# Patient Record
Sex: FEMALE | Race: WHITE | NOT HISPANIC OR LATINO | Employment: OTHER | URBAN - METROPOLITAN AREA
[De-identification: names, ages, dates, MRNs, and addresses within clinical notes are randomized per-mention and may not be internally consistent; named-entity substitution may affect disease eponyms.]

---

## 2018-02-27 ENCOUNTER — OFFICE VISIT (OUTPATIENT)
Dept: GASTROENTEROLOGY | Facility: CLINIC | Age: 73
End: 2018-02-27

## 2018-02-27 VITALS
HEART RATE: 67 BPM | HEIGHT: 67 IN | TEMPERATURE: 98 F | RESPIRATION RATE: 16 BRPM | BODY MASS INDEX: 24.42 KG/M2 | OXYGEN SATURATION: 99 % | WEIGHT: 155.6 LBS | SYSTOLIC BLOOD PRESSURE: 126 MMHG | DIASTOLIC BLOOD PRESSURE: 78 MMHG

## 2018-02-27 DIAGNOSIS — K59.1 FUNCTIONAL DIARRHEA: Primary | ICD-10-CM

## 2018-02-27 PROCEDURE — 99203 OFFICE O/P NEW LOW 30 MIN: CPT | Performed by: INTERNAL MEDICINE

## 2018-02-27 RX ORDER — MONTELUKAST SODIUM 10 MG/1
10 TABLET ORAL NIGHTLY
COMMUNITY
Start: 2018-02-06

## 2018-02-27 RX ORDER — METOPROLOL SUCCINATE 25 MG/1
25 TABLET, EXTENDED RELEASE ORAL DAILY
COMMUNITY
Start: 2018-01-28

## 2018-02-27 RX ORDER — ALOSETRON HYDROCHLORIDE 0.5 MG/1
0.5 TABLET, FILM COATED ORAL DAILY
Qty: 90 TABLET | Refills: 3 | Status: SHIPPED | OUTPATIENT
Start: 2018-02-27 | End: 2018-04-17 | Stop reason: SDUPTHER

## 2018-02-27 RX ORDER — SERTRALINE HYDROCHLORIDE 100 MG/1
100 TABLET, FILM COATED ORAL DAILY
COMMUNITY
Start: 2018-02-06

## 2018-02-27 RX ORDER — ROSUVASTATIN CALCIUM 10 MG/1
10 TABLET, COATED ORAL NIGHTLY
COMMUNITY
Start: 2018-01-10

## 2018-02-27 RX ORDER — FEXOFENADINE HCL 180 MG/1
180 TABLET ORAL DAILY
COMMUNITY

## 2018-02-27 RX ORDER — PANTOPRAZOLE SODIUM 20 MG/1
20 TABLET, DELAYED RELEASE ORAL DAILY
COMMUNITY
Start: 2017-12-18

## 2018-02-27 RX ORDER — ALOSETRON HYDROCHLORIDE 0.5 MG/1
0.5 TABLET, FILM COATED ORAL 2 TIMES DAILY
COMMUNITY
Start: 2018-01-29 | End: 2018-02-27 | Stop reason: SDUPTHER

## 2018-02-27 NOTE — PROGRESS NOTES
PCP: Jericho Ann MD    Chief Complaint   Patient presents with   • Diverticulitis        History of Present Illness:   Teresa Levy is a 72 y.o. female who presents to the GI clinic for IBS-D and h/o diverticulitis. Previously a patient of Dr. Brunner. Onset of ibs-d > 10 years ago characterized by 6 liquid stools a day and abdominal discomfort. She describes past therapies such as bentyl and elavil but doesn't remember more. Unfortunately, she reports a few weeks ago that she had llq pain, fever and reportedly noncomplicated diverticulitis. Pain without radiation, 10/10, sharp. Pain went away after a few weeks.      Past Medical History:   Diagnosis Date   • Diverticulitis of colon    • Irritable bowel syndrome        Past Surgical History:   Procedure Laterality Date   • COLONOSCOPY     • MASTECTOMY Right 1980   • UPPER GASTROINTESTINAL ENDOSCOPY           Current Outpatient Prescriptions:   •  alosetron (LOTRONEX) 0.5 MG tablet, Take 0.5 mg by mouth 2 (Two) Times a Day., Disp: , Rfl:   •  fexofenadine (ALLEGRA) 180 MG tablet, Take 180 mg by mouth Daily., Disp: , Rfl:   •  metoprolol succinate XL (TOPROL-XL) 25 MG 24 hr tablet, , Disp: , Rfl:   •  montelukast (SINGULAIR) 10 MG tablet, , Disp: , Rfl:   •  pantoprazole (PROTONIX) 20 MG EC tablet, , Disp: , Rfl:   •  rosuvastatin (CRESTOR) 10 MG tablet, , Disp: , Rfl:   •  sertraline (ZOLOFT) 100 MG tablet, , Disp: , Rfl:     No Known Allergies    History reviewed. No pertinent family history.    Social History     Social History   • Marital status: Unknown     Spouse name: N/A   • Number of children: N/A   • Years of education: N/A     Occupational History   • Not on file.     Social History Main Topics   • Smoking status: Not on file   • Smokeless tobacco: Not on file   • Alcohol use Not on file   • Drug use: Not on file   • Sexual activity: Not on file     Other Topics Concern   • Not on file     Social History Narrative   • No narrative on file        Review of Systems   Constitutional: Negative for fever.   HENT: Negative for nosebleeds.    Eyes: Negative for pain.   Respiratory: Negative for choking.    Gastrointestinal: Positive for abdominal distention, abdominal pain (Cramping), diarrhea, nausea and vomiting.   Allergic/Immunologic: Negative for food allergies and immunocompromised state.   Psychiatric/Behavioral: Negative for confusion and suicidal ideas.   All other systems reviewed and are negative.    All other systems reviewed and are negative.    Vitals:    02/27/18 0816   BP: 126/78   Pulse: 67   Resp: 16   Temp: 98 °F (36.7 °C)   SpO2: 99%       Physical Exam  General Appearance:  Vitals as above. no acute distress  Head/face:  Normocephalic, atraumatic  Eyes:   EOMI, no conjunctivitis or icterus   Nose/Sinuses:  Nares patent bilaterally without discharge or lesions  Mouth/Throat:  Posterior pharynx is pink without drainage or exudates;  dentition is in good condition and repair  Neck:  trachea is midline, no thyromegaly  Lungs:  Clear to auscultation bilaterally  Heart:  Regular rate and rhythm without murmur, gallop or rub  Abdomen:  Soft, non-tender to palpation, no obvious masses, bowel sounds positive in four quadrants; no abdominal bruits; no guarding or rebound tenderness  Neurologic:  Alert; no focal deficits; age appropriate behavior and speech  Psychiatric: mood and affect are congruent  Vascular: extremities without edema  Skin: no rash or cyanosis.      Assessment/Plan  1.) IBS-D  2.) h/o reportedly diverticulitis  - Onset of IBS-D: > 10 years ago, past managed by dr. Brunner, therapies of bentyl, elavil...  - will refill alosetron, educated on fda warning of ischemic colitis  - acquire records of last colon 2015 and diverticulitis images, labs fromrecent hospital visit  - rtc in 3 months    Trung Pulido MD  2/27/2018

## 2018-03-19 ENCOUNTER — TELEPHONE (OUTPATIENT)
Dept: GASTROENTEROLOGY | Facility: CLINIC | Age: 73
End: 2018-03-19

## 2018-03-21 NOTE — TELEPHONE ENCOUNTER
Spoke with patient, she would like to see If we can appeal. Patient states she has been on medication for 8 years and its only thing that works for her.

## 2018-03-26 ENCOUNTER — TELEPHONE (OUTPATIENT)
Dept: GASTROENTEROLOGY | Facility: CLINIC | Age: 73
End: 2018-03-26

## 2018-04-16 ENCOUNTER — TELEPHONE (OUTPATIENT)
Dept: GASTROENTEROLOGY | Facility: CLINIC | Age: 73
End: 2018-04-16

## 2018-04-16 NOTE — TELEPHONE ENCOUNTER
----- Message from Teresa Levy sent at 4/16/2018 12:20 PM EDT -----  Regarding: Prescription Question  Contact: [phone]  I take at least 1 alosetron every day but some days I have to take two. Dr. Brunner  always wrote  prescription to say 2 a day so I would have enough when I needed to take 2. Sometimes I have to take 2 for several days.  Would you please consider writing the prescription this way? Thank you

## 2018-04-17 DIAGNOSIS — K59.1 FUNCTIONAL DIARRHEA: ICD-10-CM

## 2018-04-17 RX ORDER — ALOSETRON HYDROCHLORIDE 0.5 MG/1
0.5 TABLET, FILM COATED ORAL 2 TIMES DAILY
Qty: 180 TABLET | Refills: 3 | Status: SHIPPED | OUTPATIENT
Start: 2018-04-17 | End: 2019-05-28 | Stop reason: SDUPTHER

## 2018-05-29 ENCOUNTER — OFFICE VISIT (OUTPATIENT)
Dept: GASTROENTEROLOGY | Facility: CLINIC | Age: 73
End: 2018-05-29

## 2018-05-29 VITALS
DIASTOLIC BLOOD PRESSURE: 82 MMHG | HEART RATE: 64 BPM | SYSTOLIC BLOOD PRESSURE: 138 MMHG | WEIGHT: 157 LBS | HEIGHT: 67 IN | BODY MASS INDEX: 24.64 KG/M2 | RESPIRATION RATE: 16 BRPM | OXYGEN SATURATION: 99 % | TEMPERATURE: 98 F

## 2018-05-29 DIAGNOSIS — K59.1 FUNCTIONAL DIARRHEA: Primary | ICD-10-CM

## 2018-05-29 PROCEDURE — 99212 OFFICE O/P EST SF 10 MIN: CPT | Performed by: INTERNAL MEDICINE

## 2018-05-29 NOTE — PROGRESS NOTES
PCP: Jericho Ann MD    Chief Complaint   Patient presents with   • Diarrhea     Follow-Up       History of Present Illness:   Teresa Levy is a 72 y.o. female who presents to GI clinic as a follow up for ibs-d. Previously a patient of mark brunner's, she had done well on lotronex. Still has intermittent abdominal pains. Diarrhea controlled. Has no complaints.    Past Medical History:   Diagnosis Date   • Diverticulitis of colon    • Irritable bowel syndrome        Past Surgical History:   Procedure Laterality Date   • COLONOSCOPY     • MASTECTOMY Right 1980   • UPPER GASTROINTESTINAL ENDOSCOPY           Current Outpatient Prescriptions:   •  alosetron (LOTRONEX) 0.5 MG tablet, Take 1 tablet by mouth 2 (Two) Times a Day., Disp: 180 tablet, Rfl: 3  •  fexofenadine (ALLEGRA) 180 MG tablet, Take 180 mg by mouth Daily., Disp: , Rfl:   •  metoprolol succinate XL (TOPROL-XL) 25 MG 24 hr tablet, , Disp: , Rfl:   •  montelukast (SINGULAIR) 10 MG tablet, , Disp: , Rfl:   •  pantoprazole (PROTONIX) 20 MG EC tablet, , Disp: , Rfl:   •  rosuvastatin (CRESTOR) 10 MG tablet, , Disp: , Rfl:   •  sertraline (ZOLOFT) 100 MG tablet, , Disp: , Rfl:     No Known Allergies    History reviewed. No pertinent family history.    Social History     Social History   • Marital status: Unknown     Spouse name: N/A   • Number of children: N/A   • Years of education: N/A     Occupational History   • Not on file.     Social History Main Topics   • Smoking status: Never Smoker   • Smokeless tobacco: Never Used   • Alcohol use No   • Drug use: No   • Sexual activity: Defer     Other Topics Concern   • Not on file     Social History Narrative   • No narrative on file       Review of Systems   Constitutional: Negative for fever.   HENT: Negative for nosebleeds.    Eyes: Negative for pain.   Respiratory: Negative for choking.    Gastrointestinal: Positive for abdominal pain (Cramping) and diarrhea.   Allergic/Immunologic: Negative for food  allergies and immunocompromised state.   Psychiatric/Behavioral: Negative for confusion and suicidal ideas.   All other systems reviewed and are negative.        Vitals:    05/29/18 1057   BP: 138/82   Pulse: 64   Resp: 16   Temp: 98 °F (36.7 °C)   SpO2: 99%       Physical Exam  General Appearance:  Vitals as above. no acute distress  Head/face:  Normocephalic, atraumatic  Eyes:   EOMI, no conjunctivitis or icterus   Nose/Sinuses:  Nares patent bilaterally without discharge or lesions  Mouth/Throat:  Posterior pharynx is pink without drainage or exudates;  dentition is in good condition and repair  Neck:  trachea is midline, no thyromegaly  Lungs:  Clear to auscultation bilaterally  Heart:  Regular rate and rhythm without murmur, gallop or rub  Abdomen:  Soft, non-tender to palpation, no obvious masses, bowel sounds positive in four quadrants; no abdominal bruits; no guarding or rebound tenderness  Neurologic:  Alert; no focal deficits; age appropriate behavior and speech  Psychiatric: mood and affect are congruent  Vascular: extremities without edema  Skin: no rash or cyanosis.      Assessment/Plan  1.) IBS-D  2.) intermittent abdominal pain    Will continue lotronex.  She is aware of the risk of ischemic colitis. Will add peppermint oil samples x 1 week and if improved, will continue.  F/u in 6 months.      Trung Pulido MD  5/29/2018

## 2019-03-05 ENCOUNTER — OFFICE VISIT (OUTPATIENT)
Dept: GASTROENTEROLOGY | Facility: CLINIC | Age: 74
End: 2019-03-05

## 2019-03-05 VITALS
SYSTOLIC BLOOD PRESSURE: 132 MMHG | HEART RATE: 65 BPM | BODY MASS INDEX: 24.2 KG/M2 | TEMPERATURE: 97.3 F | HEIGHT: 67 IN | DIASTOLIC BLOOD PRESSURE: 84 MMHG | WEIGHT: 154.2 LBS | OXYGEN SATURATION: 98 %

## 2019-03-05 DIAGNOSIS — K58.0 IRRITABLE BOWEL SYNDROME WITH DIARRHEA: Primary | ICD-10-CM

## 2019-03-05 PROCEDURE — 99213 OFFICE O/P EST LOW 20 MIN: CPT | Performed by: INTERNAL MEDICINE

## 2019-03-08 ENCOUNTER — PATIENT MESSAGE (OUTPATIENT)
Dept: GASTROENTEROLOGY | Facility: CLINIC | Age: 74
End: 2019-03-08

## 2019-03-08 DIAGNOSIS — R19.7 DIARRHEA, UNSPECIFIED TYPE: Primary | ICD-10-CM

## 2019-03-11 ENCOUNTER — TELEPHONE (OUTPATIENT)
Dept: GASTROENTEROLOGY | Facility: CLINIC | Age: 74
End: 2019-03-11

## 2019-05-28 DIAGNOSIS — K59.1 FUNCTIONAL DIARRHEA: ICD-10-CM

## 2019-05-28 RX ORDER — ALOSETRON HYDROCHLORIDE 0.5 MG/1
0.5 TABLET, FILM COATED ORAL 2 TIMES DAILY
Qty: 180 TABLET | Refills: 3 | Status: SHIPPED | OUTPATIENT
Start: 2019-05-28 | End: 2020-07-09

## 2019-09-03 ENCOUNTER — OFFICE VISIT (OUTPATIENT)
Dept: GASTROENTEROLOGY | Facility: CLINIC | Age: 74
End: 2019-09-03

## 2019-09-03 VITALS
SYSTOLIC BLOOD PRESSURE: 120 MMHG | RESPIRATION RATE: 18 BRPM | TEMPERATURE: 98.5 F | OXYGEN SATURATION: 100 % | WEIGHT: 153 LBS | HEIGHT: 67 IN | BODY MASS INDEX: 24.01 KG/M2 | HEART RATE: 71 BPM | DIASTOLIC BLOOD PRESSURE: 74 MMHG

## 2019-09-03 DIAGNOSIS — G89.29 CHRONIC ABDOMINAL PAIN: ICD-10-CM

## 2019-09-03 DIAGNOSIS — K21.9 GASTROESOPHAGEAL REFLUX DISEASE WITHOUT ESOPHAGITIS: Primary | ICD-10-CM

## 2019-09-03 DIAGNOSIS — R10.9 CHRONIC ABDOMINAL PAIN: ICD-10-CM

## 2019-09-03 DIAGNOSIS — K58.0 IRRITABLE BOWEL SYNDROME WITH DIARRHEA: ICD-10-CM

## 2019-09-03 PROCEDURE — 99214 OFFICE O/P EST MOD 30 MIN: CPT | Performed by: INTERNAL MEDICINE

## 2019-09-03 NOTE — PROGRESS NOTES
PCP: Jericho Ann MD    Chief Complaint   Patient presents with   • 6 month f/u     IBS-D       History of Present Illness:   Teresa Levy is a 74 y.o. female who presents to GI clinic as a follow up for IBS-D. We tried eluxadoline but she experienced sharp epigastric pain that radiated into the back.  She restarted lotronex despite risk of ischemic bowel. She reports a bout of diverticulitis since the last time I saw her. She also reports chronic llq achy pain that is constant. She also experiences chronic rlq pain that is intermittent and worse due to constipation.  She has recently developed change in bowel habits where she is having a hard bm q 2 days with straining.    Past Medical History:   Diagnosis Date   • Cancer (CMS/HCC)    • Colon polyp    • Diverticulitis of colon    • Esophageal varices (CMS/HCC)    • GERD (gastroesophageal reflux disease)    • Irritable bowel syndrome        Past Surgical History:   Procedure Laterality Date   • COLONOSCOPY     • MASTECTOMY Right 1980   • UPPER GASTROINTESTINAL ENDOSCOPY           Current Outpatient Medications:   •  alosetron (LOTRONEX) 0.5 MG tablet, Take 1 tablet by mouth 2 (Two) Times a Day., Disp: 180 tablet, Rfl: 3  •  Eluxadoline 75 MG tablet, Take 100 mg by mouth Daily., Disp: 7 tablet, Rfl: 0  •  fexofenadine (ALLEGRA) 180 MG tablet, Take 180 mg by mouth Daily., Disp: , Rfl:   •  metoprolol succinate XL (TOPROL-XL) 25 MG 24 hr tablet, Take 25 mg by mouth Daily., Disp: , Rfl:   •  montelukast (SINGULAIR) 10 MG tablet, Take 10 mg by mouth Every Night., Disp: , Rfl:   •  pantoprazole (PROTONIX) 20 MG EC tablet, Take 20 mg by mouth Daily., Disp: , Rfl:   •  rosuvastatin (CRESTOR) 10 MG tablet, Take 10 mg by mouth Every Night., Disp: , Rfl:   •  sertraline (ZOLOFT) 100 MG tablet, Take 100 mg by mouth Daily., Disp: , Rfl:     No Known Allergies    Family History   Family history unknown: Yes       Social History     Socioeconomic History   • Marital status:  Unknown     Spouse name: Not on file   • Number of children: Not on file   • Years of education: Not on file   • Highest education level: Not on file   Tobacco Use   • Smoking status: Never Smoker   • Smokeless tobacco: Never Used   Substance and Sexual Activity   • Alcohol use: No   • Drug use: No   • Sexual activity: Defer       Review of Systems   Constitutional: Negative.    HENT: Negative.    Eyes: Negative.    Respiratory: Negative.    Cardiovascular: Negative.    Gastrointestinal: Negative.    Endocrine: Negative.    Genitourinary: Negative.    Musculoskeletal: Negative.    Allergic/Immunologic: Negative.    Neurological: Negative.    Hematological: Negative.    Psychiatric/Behavioral: Negative.          There were no vitals filed for this visit.    Physical Exam  General Appearance:  Vitals as above. no acute distress  Head/face:  Normocephalic, atraumatic  Eyes:   EOMI, no conjunctivitis or icterus   Nose/Sinuses:  Nares patent bilaterally without discharge or lesions  Mouth/Throat:  Posterior pharynx is pink without drainage or exudates;  dentition is in good condition and repair  Neck:  trachea is midline, no thyromegaly  Neurologic:  Alert; no focal deficits; age appropriate behavior and speech  Psychiatric: mood and affect are congruent  Skin: no rash or cyanosis.  Abdomen:abs, soft/nt  Lungs: normal effort, no wheezing      Assessment/Plan  1.) Change in bowel habits, constipation  2.) reported history of diverticulitis  3.) Medication management lotronex  4.) IBS-D  5.) Chronic abdominal pain  6.) Adverse reaction to eluxadoline,      Unclear if her chronic abdominal pain is related to IBS.  Due to change in bowel habits, history of diverticulitis, medication use that can cause ischemic colitis, I recommend colonoscopy. Due to constipation symptoms, will have her take 1/2 of lotronex.  She reports back pain with eluxadoline.     She doesn't remember when her last colonoscopy was, I have a note from  Dr. Brunner that they were going to perform a screenign colonoscopy 2015.    She is aware of risk of ischemic colitis with lotronex use including risk of sepsis and death should a bad outcome occur.    rtc 6 months      Trung Pulido MD  9/3/2019

## 2020-07-07 DIAGNOSIS — K59.1 FUNCTIONAL DIARRHEA: ICD-10-CM

## 2020-07-09 RX ORDER — ALOSETRON HYDROCHLORIDE 0.5 MG/1
TABLET, FILM COATED ORAL
Qty: 60 TABLET | Refills: 2 | Status: SHIPPED | OUTPATIENT
Start: 2020-07-09 | End: 2021-01-05 | Stop reason: SDUPTHER

## 2021-01-03 DIAGNOSIS — K59.1 FUNCTIONAL DIARRHEA: ICD-10-CM

## 2021-01-04 RX ORDER — ALOSETRON HYDROCHLORIDE 0.5 MG/1
TABLET, FILM COATED ORAL
Qty: 60 TABLET | Refills: 1 | OUTPATIENT
Start: 2021-01-04

## 2021-01-05 ENCOUNTER — OFFICE VISIT (OUTPATIENT)
Dept: GASTROENTEROLOGY | Facility: CLINIC | Age: 76
End: 2021-01-05

## 2021-01-05 DIAGNOSIS — K59.1 FUNCTIONAL DIARRHEA: ICD-10-CM

## 2021-01-05 PROCEDURE — 99442 PR PHYS/QHP TELEPHONE EVALUATION 11-20 MIN: CPT | Performed by: INTERNAL MEDICINE

## 2021-01-05 RX ORDER — ALOSETRON HYDROCHLORIDE 0.5 MG/1
0.5 TABLET, FILM COATED ORAL 2 TIMES DAILY
Qty: 60 TABLET | Refills: 2 | Status: SHIPPED | OUTPATIENT
Start: 2021-01-05 | End: 2021-07-07

## 2021-01-05 NOTE — PROGRESS NOTES
[facility]    Referring Provider: No ref. provider found        Chief complaint diarrhea  You have chosen to receive care through a telehealth visit.  Do you consent to use a video/audio connection for your medical care today? Yes      History of present illness:  Teresa Levy is a 75 y.o. female who presents as a f/u to GI clinic via telemedicine.  Past history of chronic functional diarrhea controlled on lotronex. Has had no bouts of ischemic colitis and is aware of risk of colitis/bowel ischemia on medication. She has controlled gerd and no signs of gib loss. Feels well.     Allergies:  Patient has no known allergies.    Scheduled Meds:       Infusions:  No current facility-administered medications for this visit.       PRN Meds:      Home Meds:  (Not in a hospital admission)      ROS: Review of Systems  A complete 12 point ros was asked and is negative except for that mentioned above.  In particular:  No fever  No rash  No increased arthralgias  No worsening edema  No cough  No dyspnea  No chest pain    All other systems reviewed and are negative.    PAST MED HX: Pt  has a past medical history of Cancer (CMS/HCC), Colon polyp, Diverticulitis of colon, Esophageal varices (CMS/HCC), GERD (gastroesophageal reflux disease), and Irritable bowel syndrome.  PAST SURG HX: Pt  has a past surgical history that includes Colonoscopy; Mastectomy (Right, 1980); and Upper gastrointestinal endoscopy.  FAM HX: Family history is unknown by patient.  SOC HX: Pt  reports that she has never smoked. She has never used smokeless tobacco. She reports that she does not drink alcohol or use drugs.    There were no vitals taken for this visit.    Physical Exam  Wt Readings from Last 3 Encounters:   09/03/19 69.4 kg (153 lb)   03/05/19 69.9 kg (154 lb 3.2 oz)   05/29/18 71.2 kg (157 lb)   ,body mass index is unknown because there is no height or weight on file.      Telephone visit            Results Review:   I reviewed the  patient's new clinical results.    No results found for: WBC, HGB, HCT, MCV, PLT    No results found for: GLUCOSE, BUN, CREATININE, EGFRIFNONA, EGFRIFAFRI, BCR, CO2, CALCIUM, PROTENTOTREF, ALBUMIN, LABIL2, BILIRUBIN, AST, ALT    ASSESSMENTS/PLANS  1.) reported history of diverticulitis  2.) Medication management lotronex  3.) IBS-D  4.) Chronic abdominal pain  5.) Adverse reaction to eluxadoline      .     She doesn't remember when her last colonoscopy was, I have a note from Dr. Brunner that they were going to perform a screenign colonoscopy 2015. Will plan repeat screening colonoscopy.    She is aware of risk of ischemic colitis with lotronex use including risk of sepsis and death should a bad outcome occur.  15 minutes devoted to telephone visit  rtc 6 months            I discussed the patients findings and my recommendations with the patient    Trung Pulido MD  01/05/21  14:42 EST

## 2021-07-07 DIAGNOSIS — K59.1 FUNCTIONAL DIARRHEA: ICD-10-CM

## 2021-07-07 RX ORDER — ALOSETRON HYDROCHLORIDE 0.5 MG/1
TABLET, FILM COATED ORAL
Qty: 60 TABLET | Refills: 1 | Status: SHIPPED | OUTPATIENT
Start: 2021-07-07 | End: 2021-10-22

## 2021-10-22 DIAGNOSIS — K59.1 FUNCTIONAL DIARRHEA: ICD-10-CM

## 2021-10-22 RX ORDER — ALOSETRON HYDROCHLORIDE 0.5 MG/1
TABLET, FILM COATED ORAL
Qty: 60 TABLET | Refills: 1 | Status: SHIPPED | OUTPATIENT
Start: 2021-10-22 | End: 2022-01-04

## 2022-01-04 DIAGNOSIS — K59.1 FUNCTIONAL DIARRHEA: ICD-10-CM

## 2022-01-04 RX ORDER — ALOSETRON HYDROCHLORIDE 0.5 MG/1
TABLET, FILM COATED ORAL
Qty: 60 TABLET | Refills: 1 | Status: SHIPPED | OUTPATIENT
Start: 2022-01-04 | End: 2022-06-07 | Stop reason: SDUPTHER

## 2022-04-05 ENCOUNTER — PRIOR AUTHORIZATION (OUTPATIENT)
Dept: GASTROENTEROLOGY | Facility: CLINIC | Age: 77
End: 2022-04-05

## 2022-05-31 DIAGNOSIS — K59.1 FUNCTIONAL DIARRHEA: ICD-10-CM

## 2022-06-01 RX ORDER — ALOSETRON HYDROCHLORIDE 0.5 MG/1
TABLET, FILM COATED ORAL
Qty: 60 TABLET | Refills: 1 | OUTPATIENT
Start: 2022-06-01

## 2022-06-01 NOTE — TELEPHONE ENCOUNTER
Patient needs to be seen at least once a year. I'm ok with giving her refill for 1 month.  She can also go to local GI provider, Dr. Flores if this makes life easier for her.  Thank you

## 2022-06-07 ENCOUNTER — OFFICE VISIT (OUTPATIENT)
Dept: GASTROENTEROLOGY | Facility: CLINIC | Age: 77
End: 2022-06-07

## 2022-06-07 DIAGNOSIS — K59.1 FUNCTIONAL DIARRHEA: Primary | ICD-10-CM

## 2022-06-07 PROCEDURE — 99442 PR PHYS/QHP TELEPHONE EVALUATION 11-20 MIN: CPT | Performed by: INTERNAL MEDICINE

## 2022-06-07 RX ORDER — ALOSETRON HYDROCHLORIDE 0.5 MG/1
0.5 TABLET, FILM COATED ORAL DAILY
Qty: 60 TABLET | Refills: 1 | Status: SHIPPED | OUTPATIENT
Start: 2022-06-07 | End: 2022-09-19 | Stop reason: SDUPTHER

## 2022-06-07 NOTE — PROGRESS NOTES
"[facility]    Referring Provider: No ref. provider found        Chief complaint f/u   You have chosen to receive care through a telehealth visit.  Do you consent to use a video/audio connection for your medical care today? Yes      History of present illness:  Teresa Levy is a 76 y.o. female who presents as a f/u to GI clinic via telemedicine.  Past history of via telemedicine.  Past history of chronic functional diarrhea controlled on lotronex. Reports a few episodes of constipation which his new.  Also \"diverticulitis\".  Has had no bouts of ischemic colitis and is aware of risk of colitis/bowel ischemia on medication. She has controlled gerd and no signs of gib loss. Feels well.       Allergies:  Patient has no known allergies.    Scheduled Meds:       Infusions:  No current facility-administered medications for this visit.      PRN Meds:      Home Meds:  (Not in a hospital admission)      ROS: Review of Systems  A complete 12 point ros was asked and is negative except for that mentioned above.  In particular:  No fever  No rash  No increased arthralgias  No worsening edema  No cough  No dyspnea  No chest pain    All other systems reviewed and are negative.    PAST MED HX: Pt  has a past medical history of Cancer (CMS/HCC), Colon polyp, Diverticulitis of colon, Esophageal varices (CMS/HCC), GERD (gastroesophageal reflux disease), and Irritable bowel syndrome.  PAST SURG HX: Pt  has a past surgical history that includes Colonoscopy; Mastectomy (Right, 1980); and Upper gastrointestinal endoscopy.  FAM HX: Family history is unknown by patient.  SOC HX: Pt  reports that she has never smoked. She has never used smokeless tobacco. She reports that she does not drink alcohol and does not use drugs.    There were no vitals taken for this visit.    Physical Exam  Wt Readings from Last 3 Encounters:   09/03/19 69.4 kg (153 lb)   03/05/19 69.9 kg (154 lb 3.2 oz)   05/29/18 71.2 kg (157 lb)   ,body mass index is " unknown because there is no height or weight on file.    Telephone visit          Results Review:   I reviewed the patient's new clinical results.    No results found for: WBC, HGB, HCT, MCV, PLT    No results found for: GLUCOSE, BUN, CREATININE, EGFRIFNONA, EGFRIFAFRI, BCR, POTASSIUM, CO2, CALCIUM, PROTENTOTREF, ALBUMIN, LABIL2, BILIRUBIN, AST, ALT    ASSESSMENTS/PLANS  1.) reported history of diverticulitis  2.) Medication management lotronex  3.) IBS-D  4.) Chronic abdominal pain  5.) Adverse reaction to eluxadoline        She elected to postpone her screening colonoscopy and is reluctant to schedule but would like to proceed with screening colonoscopy.  She has experienced slight change in bowel habits, more sluggish and self reports diverticulitis.      She is aware of risk of ischemic colitis with lotronex use including risk of sepsis and death should a bad outcome occur.  15 minutes devoted to telephone visit  rtc 6 months                  I discussed the patients findings and my recommendations with the patient    Trung Pulido MD  06/07/22  12:12 EDT

## 2022-06-08 ENCOUNTER — PRIOR AUTHORIZATION (OUTPATIENT)
Dept: GASTROENTEROLOGY | Facility: CLINIC | Age: 77
End: 2022-06-08

## 2022-09-01 DIAGNOSIS — Z12.11 ENCOUNTER FOR SCREENING COLONOSCOPY: Primary | ICD-10-CM

## 2022-09-19 ENCOUNTER — OUTSIDE FACILITY SERVICE (OUTPATIENT)
Dept: GASTROENTEROLOGY | Facility: CLINIC | Age: 77
End: 2022-09-19

## 2022-09-19 DIAGNOSIS — K59.1 FUNCTIONAL DIARRHEA: Primary | ICD-10-CM

## 2022-09-19 PROCEDURE — 88305 TISSUE EXAM BY PATHOLOGIST: CPT | Performed by: INTERNAL MEDICINE

## 2022-09-19 PROCEDURE — 45390 COLONOSCOPY W/RESECTION: CPT | Performed by: INTERNAL MEDICINE

## 2022-09-19 PROCEDURE — 45385 COLONOSCOPY W/LESION REMOVAL: CPT | Performed by: INTERNAL MEDICINE

## 2022-09-19 PROCEDURE — 45388 COLONOSCOPY W/ABLATION: CPT | Performed by: INTERNAL MEDICINE

## 2022-09-19 RX ORDER — ALOSETRON HYDROCHLORIDE 0.5 MG/1
0.5 TABLET, FILM COATED ORAL 2 TIMES DAILY
Qty: 90 TABLET | Refills: 2 | Status: SHIPPED | OUTPATIENT
Start: 2022-09-19 | End: 2022-12-18

## 2022-09-20 ENCOUNTER — LAB REQUISITION (OUTPATIENT)
Dept: LAB | Facility: HOSPITAL | Age: 77
End: 2022-09-20

## 2022-09-20 DIAGNOSIS — Z80.0 FAMILY HISTORY OF MALIGNANT NEOPLASM OF DIGESTIVE ORGANS: ICD-10-CM

## 2022-09-20 DIAGNOSIS — Z86.010 PERSONAL HISTORY OF COLONIC POLYPS: ICD-10-CM

## 2022-09-20 DIAGNOSIS — K64.8 OTHER HEMORRHOIDS: ICD-10-CM

## 2022-09-20 DIAGNOSIS — D12.8 BENIGN NEOPLASM OF RECTUM: ICD-10-CM

## 2022-09-20 DIAGNOSIS — D12.5 BENIGN NEOPLASM OF SIGMOID COLON: ICD-10-CM

## 2022-09-20 DIAGNOSIS — D12.3 BENIGN NEOPLASM OF TRANSVERSE COLON: ICD-10-CM

## 2022-09-20 DIAGNOSIS — Z12.11 ENCOUNTER FOR SCREENING FOR MALIGNANT NEOPLASM OF COLON: ICD-10-CM

## 2022-09-20 DIAGNOSIS — K57.30 DIVERTICULOSIS OF LARGE INTESTINE WITHOUT PERFORATION OR ABSCESS WITHOUT BLEEDING: ICD-10-CM

## 2022-09-20 DIAGNOSIS — D12.2 BENIGN NEOPLASM OF ASCENDING COLON: ICD-10-CM

## 2022-09-27 LAB
CYTO UR: NORMAL
LAB AP CASE REPORT: NORMAL
LAB AP CLINICAL INFORMATION: NORMAL
PATH REPORT.ADDENDUM SPEC: NORMAL
PATH REPORT.FINAL DX SPEC: NORMAL
PATH REPORT.GROSS SPEC: NORMAL

## 2023-05-16 DIAGNOSIS — K59.1 FUNCTIONAL DIARRHEA: ICD-10-CM

## 2023-05-16 RX ORDER — ALOSETRON HYDROCHLORIDE 0.5 MG/1
TABLET, FILM COATED ORAL
Qty: 60 TABLET | Refills: 3 | Status: SHIPPED | OUTPATIENT
Start: 2023-05-16

## 2023-06-12 ENCOUNTER — OFFICE VISIT (OUTPATIENT)
Dept: GASTROENTEROLOGY | Facility: CLINIC | Age: 78
End: 2023-06-12
Payer: MEDICARE

## 2023-06-12 VITALS
WEIGHT: 156.4 LBS | TEMPERATURE: 96.2 F | BODY MASS INDEX: 24.5 KG/M2 | HEART RATE: 67 BPM | OXYGEN SATURATION: 98 % | SYSTOLIC BLOOD PRESSURE: 118 MMHG | DIASTOLIC BLOOD PRESSURE: 70 MMHG

## 2023-06-12 DIAGNOSIS — K59.1 FUNCTIONAL DIARRHEA: Primary | ICD-10-CM

## 2023-06-12 PROCEDURE — 1159F MED LIST DOCD IN RCRD: CPT | Performed by: INTERNAL MEDICINE

## 2023-06-12 PROCEDURE — 1160F RVW MEDS BY RX/DR IN RCRD: CPT | Performed by: INTERNAL MEDICINE

## 2023-06-12 PROCEDURE — 99214 OFFICE O/P EST MOD 30 MIN: CPT | Performed by: INTERNAL MEDICINE

## 2023-06-12 RX ORDER — SIMVASTATIN 10 MG
TABLET ORAL
COMMUNITY
Start: 2023-05-30

## 2023-06-12 RX ORDER — RIBOFLAVIN (VITAMIN B2) 100 MG
TABLET ORAL
COMMUNITY

## 2023-06-12 NOTE — PROGRESS NOTES
PCP: Jericho Ann MD    No chief complaint on file.      History of Present Illness:   Natalie Levy is a 77 y.o. female who presents to GI clinic as a follow up for IBS-D. Was doing well until antibiotics for tick bite; doxycycline has contributed to more nausea. Her  having worsening dementia not helping. She is maintaining wt. No gib loss or fever.    Past Medical History:   Diagnosis Date   • Cancer    • Colon polyp    • Diverticulitis of colon    • Esophageal varices    • GERD (gastroesophageal reflux disease)    • Irritable bowel syndrome        Past Surgical History:   Procedure Laterality Date   • COLONOSCOPY     • MASTECTOMY Right 1980   • UPPER GASTROINTESTINAL ENDOSCOPY           Current Outpatient Medications:   •  alosetron (LOTRONEX) 0.5 MG tablet, TAKE ONE TABLET BY MOUTH TWICE A DAY, Disp: 60 tablet, Rfl: 3  •  fexofenadine (ALLEGRA) 180 MG tablet, Take 1 tablet by mouth Daily., Disp: , Rfl:   •  metoprolol succinate XL (TOPROL-XL) 25 MG 24 hr tablet, Take 1 tablet by mouth Daily., Disp: , Rfl:   •  montelukast (SINGULAIR) 10 MG tablet, Take 1 tablet by mouth Every Night., Disp: , Rfl:   •  pantoprazole (PROTONIX) 20 MG EC tablet, Take 1 tablet by mouth Daily., Disp: , Rfl:   •  rosuvastatin (CRESTOR) 10 MG tablet, Take 1 tablet by mouth Every Night., Disp: , Rfl:   •  sertraline (ZOLOFT) 100 MG tablet, Take 1 tablet by mouth Daily., Disp: , Rfl:   •  simvastatin (ZOCOR) 10 MG tablet, , Disp: , Rfl:   •  Sod Picosulfate-Mag Ox-Cit Acd 10-3.5-12 MG-GM -GM/160ML solution, Take 160 mL by mouth Take As Directed for 2 doses., Disp: 320 mL, Rfl: 0  •  Vitamins A & D (Vitamin A & D) 16819-860 units tablet, Vitamin D  1000 IU tab every day, Disp: , Rfl:     No Known Allergies    Family History   Family history unknown: Yes       Social History     Socioeconomic History   • Marital status: Unknown   Tobacco Use   • Smoking status: Never   • Smokeless tobacco: Never   Vaping Use   • Vaping Use: Never  used   Substance and Sexual Activity   • Alcohol use: No   • Drug use: No   • Sexual activity: Defer       Review of Systems   A complete 12 point ros was asked and is negative except for that mentioned above.  In particular:  No fever  No rash  No increased arthralgias  No worsening edema  No cough  No dyspnea  No chest pain      Vitals:    06/12/23 1436   BP: 118/70   Pulse: 67   Temp: 96.2 °F (35.7 °C)   SpO2: 98%       Physical Exam  General: well developed, well nourished  A+O x 3 NAD  HEENT: NCAT, pupils equal appearing, sclera appear white  NECK: full ROM  Respiratory: symmetric chest rise, normal effort, normal work of breathing, no overt rales  Abdomen: non-distended  Skin: normal color, no jaundice  Neuro: no tremor, no facial droop  Psych: normal mood and affect    Workup: colonoscopy 9/2022< polyps, normal mucosa, repeat 1 year  Assessment/Plan  1.) reported history of diverticulitis  2.) Medication management lotronex  3.) IBS-D  4.) Chronic abdominal pain  5.) Adverse reaction to eluxadoline    Recent stressors:  with dementia, family members also unwell. Also given antibiotics for tick bite    Continue optimization of IBS with continued medication and reduction of stressors    She is aware of risk of ischemic colitis with lotronex use including risk of sepsis and death should a bad outcome occur.  15 minutes devoted to telephone visit  rtc 6 months    6.) intermittent nausea  ? microbiome change given chronic doxycycline therapy that recently finished. Recommended  florastor tid x 3 weeks; if no improvement x 2 weeks, recommend align x 1 month; future consideration egd.    7.) HCM  Repeat colonoscopy 9/2023            Trung Pulido MD  6/12/2023

## 2023-09-14 RX ORDER — SODIUM PICOSULFATE, MAGNESIUM OXIDE, AND ANHYDROUS CITRIC ACID 10; 3.5; 12 MG/160ML; G/160ML; G/160ML
350 LIQUID ORAL TAKE AS DIRECTED
Qty: 350 ML | Refills: 0 | Status: SHIPPED | OUTPATIENT
Start: 2023-09-14

## 2023-09-21 ENCOUNTER — OUTSIDE FACILITY SERVICE (OUTPATIENT)
Dept: GASTROENTEROLOGY | Facility: CLINIC | Age: 78
End: 2023-09-21
Payer: MEDICARE

## 2023-09-21 PROCEDURE — 45380 COLONOSCOPY AND BIOPSY: CPT | Performed by: INTERNAL MEDICINE

## 2023-09-21 PROCEDURE — 88305 TISSUE EXAM BY PATHOLOGIST: CPT

## 2023-09-21 PROCEDURE — 45385 COLONOSCOPY W/LESION REMOVAL: CPT | Performed by: INTERNAL MEDICINE

## 2023-09-22 ENCOUNTER — LAB REQUISITION (OUTPATIENT)
Dept: LAB | Facility: HOSPITAL | Age: 78
End: 2023-09-22
Payer: MEDICARE

## 2023-09-22 DIAGNOSIS — Z80.0 FAMILY HISTORY OF MALIGNANT NEOPLASM OF DIGESTIVE ORGANS: ICD-10-CM

## 2023-09-22 DIAGNOSIS — D12.3 BENIGN NEOPLASM OF TRANSVERSE COLON: ICD-10-CM

## 2023-09-22 DIAGNOSIS — D12.5 BENIGN NEOPLASM OF SIGMOID COLON: ICD-10-CM

## 2023-09-22 DIAGNOSIS — K57.30 DIVERTICULOSIS OF LARGE INTESTINE WITHOUT PERFORATION OR ABSCESS WITHOUT BLEEDING: ICD-10-CM

## 2023-09-22 DIAGNOSIS — K63.89 OTHER SPECIFIED DISEASES OF INTESTINE: ICD-10-CM

## 2023-09-22 DIAGNOSIS — Z12.11 ENCOUNTER FOR SCREENING FOR MALIGNANT NEOPLASM OF COLON: ICD-10-CM

## 2023-09-22 DIAGNOSIS — Z86.010 PERSONAL HISTORY OF COLONIC POLYPS: ICD-10-CM

## 2023-09-22 DIAGNOSIS — K64.8 OTHER HEMORRHOIDS: ICD-10-CM

## 2023-09-26 LAB — REF LAB TEST METHOD: NORMAL

## 2023-10-21 DIAGNOSIS — K59.1 FUNCTIONAL DIARRHEA: ICD-10-CM

## 2023-10-23 RX ORDER — ALOSETRON HYDROCHLORIDE 0.5 MG/1
0.5 TABLET, FILM COATED ORAL 2 TIMES DAILY
Qty: 60 TABLET | Refills: 3 | Status: SHIPPED | OUTPATIENT
Start: 2023-10-23

## 2023-10-23 NOTE — TELEPHONE ENCOUNTER
Rx Refill Note  Requested Prescriptions     Pending Prescriptions Disp Refills    alosetron (LOTRONEX) 0.5 MG tablet [Pharmacy Med Name: ALOSETRON HCL 0.5 MG TABLET] 60 tablet 3     Sig: TAKE 1 TABLET BY MOUTH TWICE A DAY      Last office visit with prescribing clinician: 6/12/2023   Last telemedicine visit with prescribing clinician: Visit date not found   Next office visit with prescribing clinician: 6/12/2024                       \    LAUREEN Warner  10/23/23, 08:15 EDT

## 2024-06-06 ENCOUNTER — TELEPHONE (OUTPATIENT)
Dept: GASTROENTEROLOGY | Facility: CLINIC | Age: 79
End: 2024-06-06
Payer: MEDICARE

## 2024-06-06 NOTE — TELEPHONE ENCOUNTER
Hub staff attempted to follow warm transfer process and was unsuccessful     Caller: Natalie Levy    Relationship to patient: Self    Best call back number: [phone]    Patient is needing: PT IS SEEING IF SHE CAN CHANGE HER VISIT 06/12/2024 TO A PHONE CALL VISIT. IF NOT PT WILL NEED TO RESCHEDULE. PLEASE GIVE PT A CALL BACK AND IF NOT ABLE TO REACH PT. IT IS OKAY TO LEAVE AN VOICEMAIL.

## 2024-06-10 DIAGNOSIS — K59.1 FUNCTIONAL DIARRHEA: ICD-10-CM

## 2024-06-10 RX ORDER — ALOSETRON HYDROCHLORIDE 0.5 MG/1
0.5 TABLET ORAL 2 TIMES DAILY
Qty: 60 TABLET | Refills: 3 | Status: SHIPPED | OUTPATIENT
Start: 2024-06-10

## 2024-06-10 NOTE — TELEPHONE ENCOUNTER
Rx Refill Note  Pending Prescriptions:                       Disp   Refills    alosetron (LOTRONEX) 0.5 MG tablet [Pharma*60 tab*3        Sig: TAKE 1 TABLET BY MOUTH TWICE A DAY    Last office visit with prescribing clinician: 6/12/2023   Last telemedicine visit with prescribing clinician: Visit date not found   Next office visit with prescribing clinician: 6/12/2024         Francesca Brown MA  06/10/24, 08:07 EDT

## 2024-06-12 ENCOUNTER — TELEMEDICINE (OUTPATIENT)
Dept: GASTROENTEROLOGY | Facility: CLINIC | Age: 79
End: 2024-06-12
Payer: MEDICARE

## 2024-06-12 DIAGNOSIS — K59.1 FUNCTIONAL DIARRHEA: Primary | ICD-10-CM

## 2024-06-12 NOTE — PROGRESS NOTES
PCP: Jericho Ann MD  [facility] [place] [facility]    Referring Provider: No ref. provider found        Chief complaint f/u ibs-d  You have chosen to receive care through a telehealth visit.  Do you consent to use a video/audio connection for your medical care today? Yes      History of present illness:  Natalie Levy is a 78 y.o. female who presents as a f/u to GI clinic via telemedicine.  Past history of ibs-d. Doing well on lotronex. No ischemia and aware of risk. No gib loss or fever.    Allergies:  Patient has no known allergies.    Scheduled Meds:       Infusions:  No current facility-administered medications for this visit.      PRN Meds:      Home Meds:  (Not in a hospital admission)      ROS: Review of Systems  A complete 12 point ros was asked and is negative except for that mentioned above.  In particular:  No fever  No rash  No increased arthralgias  No worsening edema  No cough  No dyspnea  No chest pain    All other systems reviewed and are negative.    PAST MED HX: Pt  has a past medical history of Cancer, Colon polyp, Diverticulitis of colon, Esophageal varices, GERD (gastroesophageal reflux disease), and Irritable bowel syndrome.  PAST SURG HX: Pt  has a past surgical history that includes Colonoscopy; Mastectomy (Right, 1980); and Upper gastrointestinal endoscopy.  FAM HX: Family history is unknown by patient.  SOC HX: Pt  reports that she has never smoked. She has never used smokeless tobacco. She reports that she does not drink alcohol and does not use drugs.    There were no vitals taken for this visit.    Physical Exam  Wt Readings from Last 3 Encounters:   06/12/23 70.9 kg (156 lb 6.4 oz)   09/03/19 69.4 kg (153 lb)   03/05/19 69.9 kg (154 lb 3.2 oz)   ,body mass index is unknown because there is no height or weight on file.      General: well developed, well nourished  A+O x 3 NAD  HEENT: NCAT, pupils equal appearing, sclera appear white  NECK: full ROM  Respiratory: symmetric chest rise, normal  "effort, normal work of breathing, no overt rales  Abomen: non-distended  Skin: normal color, no jaundice  Neuro: no tremor, no facial droop  Psych: normal mood and affect      A complete 12 point ros was asked and is negative except for that mentioned above.  In particular:  No fever  No rash  No increased arthralgias  No worsening edema  No cough  No dyspnea  No chest pain        Results Review:   I reviewed the patient's new clinical results.    No results found for: \"WBC\", \"HGB\", \"HCT\", \"MCV\", \"PLT\"    No results found for: \"GLUCOSE\", \"BUN\", \"CREATININE\", \"EGFRIFNONA\", \"EGFRIFAFRI\", \"BCR\", \"POTASSIUM\", \"CO2\", \"CALCIUM\", \"PROTENTOTREF\", \"ALBUMIN\", \"LABIL2\", \"BILIRUBIN\", \"AST\", \"ALT\"    ASSESSMENTS/PLANS  Workup: colonoscopy 9/2022< polyps, normal mucosa, repeat 1 year  Assessment/Plan  1.) reported history of diverticulitis  2.) Medication management lotronex  3.) IBS-D  4.) Chronic abdominal pain  5.) Adverse reaction to eluxadoline    Recent stressors:  with dementia, family members also unwell. Also given antibiotics for tick bite    Continue optimization of IBS with continued medication and reduction of stressors    She is aware of risk of ischemic colitis with lotronex use including risk of sepsis and death should a bad outcome occur.      7.) HCM  Repeat colonoscopy 9/2026          I discussed the patients findings and my recommendations with the patient    Trung Pulido MD  06/12/24  15:01 EDT    "

## 2025-01-28 ENCOUNTER — TELEPHONE (OUTPATIENT)
Dept: GASTROENTEROLOGY | Facility: CLINIC | Age: 80
End: 2025-01-28
Payer: MEDICARE

## 2025-01-28 NOTE — TELEPHONE ENCOUNTER
Caller: Natalie Levy    Relationship: Self    Best call back number: [phone]     Which medication are you concerned about: ALOSETRON    Who prescribed you this medication: [facility]    What are your concerns: [employer] TOLD PATIENT THAT MEDICATION IS NO LONGER COVERED. PT IS NEEDING A SUBSTITUTE PRESCRIBED

## 2025-02-03 RX ORDER — LOPERAMIDE HYDROCHLORIDE 2 MG/1
4 TABLET ORAL 4 TIMES DAILY PRN
Qty: 120 TABLET | Refills: 3 | Status: SHIPPED | OUTPATIENT
Start: 2025-02-03

## 2025-02-03 NOTE — TELEPHONE ENCOUNTER
Provider: DR DIANNE GAINES    Caller: Natalie Levy    Relationship to Patient: Self    Pharmacy: [facility]    Phone Number: [phone]     Reason for Call: PT IS WONDERING ABOUT HER MEDICATION PLEASE ADVISE AND CALL PT BACK

## 2025-02-12 ENCOUNTER — TELEPHONE (OUTPATIENT)
Dept: GASTROENTEROLOGY | Facility: CLINIC | Age: 80
End: 2025-02-12
Payer: MEDICARE

## 2025-02-12 NOTE — TELEPHONE ENCOUNTER
Received Prior Authorization request from [facility] for patients Alosetron medication requested will filled out and signed by Dr. Pulido and faxed back.    I have spoken to the patients daughter to let her know I would update her as soon as I heard something.

## 2025-04-08 DIAGNOSIS — K59.1 FUNCTIONAL DIARRHEA: ICD-10-CM

## 2025-04-08 RX ORDER — ALOSETRON HYDROCHLORIDE 0.5 MG/1
0.5 TABLET ORAL 2 TIMES DAILY
Qty: 60 TABLET | Refills: 3 | Status: SHIPPED | OUTPATIENT
Start: 2025-04-08

## 2025-06-05 ENCOUNTER — TELEPHONE (OUTPATIENT)
Dept: GASTROENTEROLOGY | Facility: CLINIC | Age: 80
End: 2025-06-05

## 2025-06-05 NOTE — TELEPHONE ENCOUNTER
CALLED PT TO RS HER APPT ON 6/18 WITH DR GAINES. I GOT A VOICEMAIL, LEFT A MESSAGE TELLING HER THAT HER APPT HAS TO BE RS AND FOR HER TO CLL BACK TO DO THAT. ALSO LET HER KNOW THAT AT THIS TIME WE ARE CANCELING THIS APPT. SHE CAN RS WITH DR GAINES OR [facility].

## 2025-06-09 ENCOUNTER — TELEPHONE (OUTPATIENT)
Dept: GASTROENTEROLOGY | Facility: CLINIC | Age: 80
End: 2025-06-09

## 2025-06-09 NOTE — TELEPHONE ENCOUNTER
Name: Natalie Levy    Relationship: Self    Best Callback Number: [phone]     Patient would like to Reschedule their appointment. Unable to schedule within the 3 week timeframe.     Please call patient.

## 2025-07-22 ENCOUNTER — OFFICE VISIT (OUTPATIENT)
Dept: GASTROENTEROLOGY | Facility: CLINIC | Age: 80
End: 2025-07-22
Payer: MEDICARE

## 2025-07-22 ENCOUNTER — LAB (OUTPATIENT)
Dept: LAB | Facility: HOSPITAL | Age: 80
End: 2025-07-22
Payer: MEDICARE

## 2025-07-22 VITALS
DIASTOLIC BLOOD PRESSURE: 90 MMHG | OXYGEN SATURATION: 97 % | HEIGHT: 67 IN | WEIGHT: 157.4 LBS | TEMPERATURE: 96.9 F | BODY MASS INDEX: 24.71 KG/M2 | HEART RATE: 84 BPM | SYSTOLIC BLOOD PRESSURE: 134 MMHG | RESPIRATION RATE: 12 BRPM

## 2025-07-22 DIAGNOSIS — R52 PAIN, UNSPECIFIED: ICD-10-CM

## 2025-07-22 DIAGNOSIS — R35.0 URINARY FREQUENCY: Primary | ICD-10-CM

## 2025-07-22 DIAGNOSIS — K59.1 FUNCTIONAL DIARRHEA: ICD-10-CM

## 2025-07-22 DIAGNOSIS — R35.0 URINARY FREQUENCY: ICD-10-CM

## 2025-07-22 DIAGNOSIS — R19.4 CHANGE IN BOWEL HABITS: ICD-10-CM

## 2025-07-22 LAB
ALBUMIN SERPL-MCNC: 4.5 G/DL (ref 3.5–5.2)
ALBUMIN/GLOB SERPL: 1.1 G/DL
ALP SERPL-CCNC: 91 U/L (ref 39–117)
ALT SERPL W P-5'-P-CCNC: 8 U/L (ref 1–33)
ANION GAP SERPL CALCULATED.3IONS-SCNC: 10.5 MMOL/L (ref 5–15)
AST SERPL-CCNC: 20 U/L (ref 1–32)
BACTERIA UR QL AUTO: NORMAL /HPF
BASOPHILS # BLD AUTO: 0.03 10*3/MM3 (ref 0–0.2)
BASOPHILS NFR BLD AUTO: 0.5 % (ref 0–1.5)
BILIRUB SERPL-MCNC: 0.3 MG/DL (ref 0–1.2)
BILIRUB UR QL STRIP: NEGATIVE
BUN SERPL-MCNC: 11 MG/DL (ref 8–23)
BUN/CREAT SERPL: 9.1 (ref 7–25)
CALCIUM SPEC-SCNC: 9.9 MG/DL (ref 8.6–10.5)
CHLORIDE SERPL-SCNC: 105 MMOL/L (ref 98–107)
CLARITY UR: CLEAR
CO2 SERPL-SCNC: 24.5 MMOL/L (ref 22–29)
COLOR UR: YELLOW
CREAT SERPL-MCNC: 1.21 MG/DL (ref 0.57–1)
DEPRECATED RDW RBC AUTO: 42 FL (ref 37–54)
EGFRCR SERPLBLD CKD-EPI 2021: 46 ML/MIN/1.73
EOSINOPHIL # BLD AUTO: 0.06 10*3/MM3 (ref 0–0.4)
EOSINOPHIL NFR BLD AUTO: 0.9 % (ref 0.3–6.2)
ERYTHROCYTE [DISTWIDTH] IN BLOOD BY AUTOMATED COUNT: 12.6 % (ref 12.3–15.4)
GLOBULIN UR ELPH-MCNC: 4 GM/DL
GLUCOSE SERPL-MCNC: 93 MG/DL (ref 65–99)
GLUCOSE UR STRIP-MCNC: NEGATIVE MG/DL
HCT VFR BLD AUTO: 37.6 % (ref 34–46.6)
HGB BLD-MCNC: 12.4 G/DL (ref 12–15.9)
HGB UR QL STRIP.AUTO: ABNORMAL
HOLD SPECIMEN: NORMAL
HYALINE CASTS UR QL AUTO: NORMAL /LPF
IMM GRANULOCYTES # BLD AUTO: 0.01 10*3/MM3 (ref 0–0.05)
IMM GRANULOCYTES NFR BLD AUTO: 0.2 % (ref 0–0.5)
KETONES UR QL STRIP: NEGATIVE
LEUKOCYTE ESTERASE UR QL STRIP.AUTO: NEGATIVE
LYMPHOCYTES # BLD AUTO: 2.62 10*3/MM3 (ref 0.7–3.1)
LYMPHOCYTES NFR BLD AUTO: 39.5 % (ref 19.6–45.3)
MCH RBC QN AUTO: 30.2 PG (ref 26.6–33)
MCHC RBC AUTO-ENTMCNC: 33 G/DL (ref 31.5–35.7)
MCV RBC AUTO: 91.7 FL (ref 79–97)
MONOCYTES # BLD AUTO: 0.67 10*3/MM3 (ref 0.1–0.9)
MONOCYTES NFR BLD AUTO: 10.1 % (ref 5–12)
NEUTROPHILS NFR BLD AUTO: 3.24 10*3/MM3 (ref 1.7–7)
NEUTROPHILS NFR BLD AUTO: 48.8 % (ref 42.7–76)
NITRITE UR QL STRIP: NEGATIVE
NRBC BLD AUTO-RTO: 0 /100 WBC (ref 0–0.2)
PH UR STRIP.AUTO: 6 [PH] (ref 5–8)
PLATELET # BLD AUTO: 254 10*3/MM3 (ref 140–450)
PMV BLD AUTO: 9.7 FL (ref 6–12)
POTASSIUM SERPL-SCNC: 4 MMOL/L (ref 3.5–5.2)
PROT SERPL-MCNC: 8.5 G/DL (ref 6–8.5)
PROT UR QL STRIP: NEGATIVE
RBC # BLD AUTO: 4.1 10*6/MM3 (ref 3.77–5.28)
RBC # UR STRIP: NORMAL /HPF
REF LAB TEST METHOD: NORMAL
SODIUM SERPL-SCNC: 140 MMOL/L (ref 136–145)
SP GR UR STRIP: 1.01 (ref 1–1.03)
SQUAMOUS #/AREA URNS HPF: NORMAL /HPF
TSH SERPL DL<=0.05 MIU/L-ACNC: 2.09 UIU/ML (ref 0.27–4.2)
UROBILINOGEN UR QL STRIP: ABNORMAL
WBC # UR STRIP: NORMAL /HPF
WBC NRBC COR # BLD AUTO: 6.63 10*3/MM3 (ref 3.4–10.8)

## 2025-07-22 PROCEDURE — 86258 DGP ANTIBODY EACH IG CLASS: CPT

## 2025-07-22 PROCEDURE — 85025 COMPLETE CBC W/AUTO DIFF WBC: CPT

## 2025-07-22 PROCEDURE — 84443 ASSAY THYROID STIM HORMONE: CPT

## 2025-07-22 PROCEDURE — 87086 URINE CULTURE/COLONY COUNT: CPT | Performed by: NURSE PRACTITIONER

## 2025-07-22 PROCEDURE — 1160F RVW MEDS BY RX/DR IN RCRD: CPT | Performed by: NURSE PRACTITIONER

## 2025-07-22 PROCEDURE — 1159F MED LIST DOCD IN RCRD: CPT | Performed by: NURSE PRACTITIONER

## 2025-07-22 PROCEDURE — 81001 URINALYSIS AUTO W/SCOPE: CPT

## 2025-07-22 PROCEDURE — 80053 COMPREHEN METABOLIC PANEL: CPT

## 2025-07-22 PROCEDURE — 36415 COLL VENOUS BLD VENIPUNCTURE: CPT

## 2025-07-22 PROCEDURE — 86364 TISS TRNSGLTMNASE EA IG CLAS: CPT

## 2025-07-22 PROCEDURE — 99214 OFFICE O/P EST MOD 30 MIN: CPT | Performed by: NURSE PRACTITIONER

## 2025-07-22 PROCEDURE — 82784 ASSAY IGA/IGD/IGG/IGM EACH: CPT

## 2025-07-22 RX ORDER — FLUTICASONE PROPIONATE 50 MCG
SPRAY, SUSPENSION (ML) NASAL
COMMUNITY
Start: 2025-04-04

## 2025-07-22 NOTE — PROGRESS NOTES
Follow Up      Date: 2025   Patient Name: Natalie Levy  : 1945   MRN: [id number]     Chief Complaint:    Chief Complaint   Patient presents with    Diarrhea     FOLLOW UP, RECENT FLAREUPS       History of Present Illness:   History of Present Illness  80-year-old female presents for follow-up on IBS-D. Presents with her daughter. Taking Lotronex 0.5 mg BID.    IBS-D  - Condition well-managed with Lotronex, but had severe episode last week with bloating, excessive gas, constipation followed by diarrhea, irregular bowel movements up to three times a day, watery stools, severe pain in her lower abdomen.  - Taking one tablet of Lotronex daily instead of prescribed two.  - Reports poor eating habits and inadequate water intake since 's death in 2025.  - Does not consume yogurt or probiotic-rich foods.  - Taking Florastor BID for five days, believes beneficial.  - No blood in urine or black, tarry stools.  - No new medications.  - History of diverticulitis and diverticulosis.    Suspected UTI  - Suspects UTI due to increased frequency of urination and burning sensation.  - History of UTIs.    Pain Management  - Taking Tylenol Arthritis for pain management.    Osteoporosis  - Has osteoporosis, receiving injections for past year, last injection last week.    SOCIAL HISTORY  . Consumes ice cream nightly, eats garden vegetables, does not eat yogurt, drinks little water, eats cereal and ice cream, does not cook or eat well, per her report.       Adverse reaction to eluxadoline Pamela    COLONOSCOPY (2023) Screening, per Dr. Pulido: Internal hemorrhoids.  Diverticulosis in the left and transverse colon.  Congested mucosa in cecum.  Three 4 to 6 mm sessile polyps removed from transverse colon.  Two 4 to 6 mm sessile in sigmoid colon.    Subjective      Review of Systems:   Review of Systems   Gastrointestinal:  Positive for abdominal pain, constipation and diarrhea. Negative for blood in  "stool.       I have reviewed the patients family history, social history, past medical history, past surgical history and have updated it as appropriate.     Medications:     Current Outpatient Medications:     alosetron (LOTRONEX) 0.5 MG tablet, TAKE 1 TABLET BY MOUTH 2 TIMES A DAY, Disp: 60 tablet, Rfl: 3    fexofenadine (ALLEGRA) 180 MG tablet, Take 1 tablet by mouth Daily., Disp: , Rfl:     fluticasone (FLONASE) 50 MCG/ACT nasal spray, SPRAY 2 SPRAYS IN EACH NOSTRIL EVERY NIGHT AT BEDTIME, Disp: , Rfl:     metoprolol succinate XL (TOPROL-XL) 25 MG 24 hr tablet, Take 1 tablet by mouth Daily., Disp: , Rfl:     montelukast (SINGULAIR) 10 MG tablet, Take 1 tablet by mouth Every Night., Disp: , Rfl:     pantoprazole (PROTONIX) 20 MG EC tablet, Take 1 tablet by mouth Daily., Disp: , Rfl:     rosuvastatin (CRESTOR) 10 MG tablet, Take 1 tablet by mouth Every Night., Disp: , Rfl:     sertraline (ZOLOFT) 100 MG tablet, Take 1 tablet by mouth Daily., Disp: , Rfl:     simvastatin (ZOCOR) 10 MG tablet, , Disp: , Rfl:     Vitamins A & D (Vitamin A & D) 99709-757 units tablet, Vitamin D  1000 IU tab every day, Disp: , Rfl:     Allergies:   No Known Allergies      Objective     Physical Exam:  Vital Signs:   Vitals:    07/22/25 1245   BP: 134/90   BP Location: Right arm   Patient Position: Sitting   Cuff Size: Adult   Pulse: 84   Resp: 12   Temp: 96.9 °F (36.1 °C)   TempSrc: Infrared   SpO2: 97%   Weight: 71.4 kg (157 lb 6.4 oz)   Height: 170.2 cm (67.01\")   PainSc: 4    PainLoc: Abdomen     Body mass index is 24.65 kg/m².     Metrics:   Natalie Levy  reports that she has never smoked. She has never used smokeless tobacco.        Colonoscopy/Colon Cancer Screening:  COLONOSCOPY (09/21/2023) Screening with history of 20 mm polyp in sigmoid colon with area tattoo, per Dr. Pulido: Internal hemorrhoids.  Diverticulosis in the left and transverse colon.  Congested mucosa in cecum.  Three 4 to 6 mm sessile polyps removed from " transverse colon.  Two 4 to 6 mm sessile in sigmoid colon. 3 year repeat recommended  Path: normal cecal biopsy. Transverse colon polyps were tubular adenomas. Normal colonic mucosa from sigmoid colon.    Physical Exam  Vitals and nursing note reviewed.   Constitutional:       General: She is not in acute distress.     Appearance: Normal appearance. She is well-developed.   HENT:      Head: Normocephalic and atraumatic.      Right Ear: External ear normal.      Left Ear: External ear normal.      Nose: Nose normal.      Mouth/Throat:      Mouth: Mucous membranes are moist.   Eyes:      Extraocular Movements: Extraocular movements intact.      Conjunctiva/sclera: Conjunctivae normal.      Pupils: Pupils are equal, round, and reactive to light.   Cardiovascular:      Rate and Rhythm: Normal rate and regular rhythm.      Heart sounds: Normal heart sounds.   Pulmonary:      Effort: Pulmonary effort is normal.      Breath sounds: Normal breath sounds.   Abdominal:      General: Abdomen is flat. Bowel sounds are normal. There is no distension.      Palpations: Abdomen is soft.      Tenderness: There is no abdominal tenderness.      Hernia: No hernia is present.   Musculoskeletal:         General: Normal range of motion.      Cervical back: Normal range of motion.   Skin:     General: Skin is warm and dry.   Neurological:      General: No focal deficit present.      Mental Status: She is alert and oriented to person, place, and time.   Psychiatric:         Attention and Perception: Attention normal.         Mood and Affect: Mood normal.         Speech: Speech normal.         Behavior: Behavior normal. Behavior is cooperative.         Cognition and Memory: Cognition normal.         Assessment / Plan      Assessment/Plan:   Diagnoses and all orders for this visit:    1. Urinary frequency (Primary)  -     Urinalysis With Culture If Indicated -; Future    2. Functional diarrhea  -     CBC & Differential; Future  -      Comprehensive Metabolic Panel; Future  -     TSH Rfx On Abnormal To Free T4; Future  -     Celiac Ab tTG DGP TIgA; Future    3. Pain, unspecified  -     TSH Rfx On Abnormal To Free T4; Future    4. Change in bowel habits         Assessment & Plan  1. IBS-D:  - Significant bloating and gas last week, constipation followed by watery stools.  - Discussed risk vs. Benefits and she wants to remain on Lotronex, despite recent constipation and risk of ischemic colitis. Change Lotronex dosing to 0.5 mg QOD.   - Discontinue Lotronex if constipation occurs and contact office.  - Go to ER with bloody stools and abd pain.  - Maintain balanced diet, consider adding protein shake to morning routine.  - Probiotics such as Align or Culturelle recommended if beneficial to patient, but there is a lack of evidence to support this.     2. Suspected UTI:  - Symptoms suggestive of UTI, including cramping and burning sensation during urination.  - Urinalysis ordered to confirm diagnosis.    4. Medication Management/change in bowel habits:  - Recommended colonoscopy, due to change in bowel habits, but she declines at this time.   - Check labs due to change in bowel habits  - Taking Tylenol Arthritis for pain management.  - No changes necessary.    Follow Up:   Return in about 3 months (around 10/22/2025).    Plan of care reviewed with the patient at the conclusion of today's visit.  Education was provided regarding diagnosis, management, and any prescribed or recommended OTC medications.  Patient verbalized understanding of and agreement with management plan.     NOTE TO PATIENT: The 21st Century Cures Act makes medical notes like these available to patients in the interest of transparency. However, be advised this is a medical document. It is intended as peer to peer communication. It is written in medical language and may contain abbreviations or verbiage that are unfamiliar. It may appear blunt or direct. Medical documents are intended  to carry relevant information, facts as evident, and the clinical opinion of the practitioner.     Patient or patient representative verbalized consent for the use of Ambient Listening during the visit with  GALINDO Wilson for chart documentation. 7/22/2025      GALINDO Hu  [facility] [place] [facility]

## 2025-07-22 NOTE — PATIENT INSTRUCTIONS
Watch for Constipation: If you become constipated, stop Lotronex immediately and contact our office. Do not restart unless we advise you to    Report GI Symptoms: New or worsening abdominal pain or blood in stool could signal ischemic colitis. Stop the medication and call our office.

## 2025-07-23 ENCOUNTER — RESULTS FOLLOW-UP (OUTPATIENT)
Dept: LAB | Facility: HOSPITAL | Age: 80
End: 2025-07-23
Payer: MEDICARE

## 2025-07-23 LAB
GLIADIN PEPTIDE IGA SER-ACNC: 4 UNITS (ref 0–19)
GLIADIN PEPTIDE IGG SER-ACNC: 2 UNITS (ref 0–19)
IGA SERPL-MCNC: 253 MG/DL (ref 64–422)
TTG IGA SER-ACNC: <2 U/ML (ref 0–3)
TTG IGG SER-ACNC: 5 U/ML (ref 0–5)

## 2025-07-23 NOTE — LETTER
Natalie KUNZ Cam  [address]  [place] [address]    July 24, 2025     Dear Ms. Levy:    Below are the results from your recent visit:    Resulted Orders   Urinalysis With Culture If Indicated - Urine, Clean Catch   Result Value Ref Range    Color, UA Yellow Yellow, Straw    Appearance, UA Clear Clear    pH, UA 6.0 5.0 - 8.0    Specific Gravity, UA 1.011 1.005 - 1.030    Glucose, UA Negative Negative    Ketones, UA Negative Negative    Bilirubin, UA Negative Negative    Blood, UA Trace (A) Negative    Protein, UA Negative Negative    Leuk Esterase, UA Negative Negative    Nitrite, UA Negative Negative    Urobilinogen, UA 0.2 E.U./dL 0.2 - 1.0 E.U./dL   Comprehensive Metabolic Panel   Result Value Ref Range    Glucose 93 65 - 99 mg/dL    BUN 11.0 8.0 - 23.0 mg/dL    Creatinine 1.21 (H) 0.57 - 1.00 mg/dL    Sodium 140 136 - 145 mmol/L    Potassium 4.0 3.5 - 5.2 mmol/L    Chloride 105 98 - 107 mmol/L    CO2 24.5 22.0 - 29.0 mmol/L    Calcium 9.9 8.6 - 10.5 mg/dL    Total Protein 8.5 6.0 - 8.5 g/dL    Albumin 4.5 3.5 - 5.2 g/dL    ALT (SGPT) 8 1 - 33 U/L    AST (SGOT) 20 1 - 32 U/L    Alkaline Phosphatase 91 39 - 117 U/L    Total Bilirubin 0.3 0.0 - 1.2 mg/dL    Globulin 4.0 gm/dL    A/G Ratio 1.1 g/dL    BUN/Creatinine Ratio 9.1 7.0 - 25.0    Anion Gap 10.5 5.0 - 15.0 mmol/L    eGFR 46.0 (L) >60.0 mL/min/1.73   TSH Rfx On Abnormal To Free T4   Result Value Ref Range    TSH 2.090 0.270 - 4.200 uIU/mL   Celiac Ab tTG DGP TIgA   Result Value Ref Range    IgA 253 64 - 422 mg/dL    Gliadin Deamidated Peptide Ab, IgA 4 0 - 19 units      Comment:                         Negative                   0 - 19                     Weak Positive             20 - 30                     Moderate to Strong Positive   >30    Deaminated Gliadin Ab IgG 2 0 - 19 units      Comment:                         Negative                   0 - 19                     Weak Positive             20 - 30                     Moderate to Strong Positive   >30     Tissue Transglutaminase IgA <2 0 - 3 U/mL      Comment:                                    Negative        0 -  3                                Weak Positive   4 - 10                                Positive           >10   Tissue Transglutaminase (tTG) has been identified   as the endomysial antigen.  Studies have demonstr-   ated that endomysial IgA antibodies have over 99%   specificity for gluten sensitive enteropathy.    Tissue Transglutaminase IgG 5 0 - 5 U/mL      Comment:                                    Negative        0 - 5                                Weak Positive   6 - 9                                Positive           >9   CBC Auto Differential   Result Value Ref Range    WBC 6.63 3.40 - 10.80 10*3/mm3    RBC 4.10 3.77 - 5.28 10*6/mm3    Hemoglobin 12.4 12.0 - 15.9 g/dL    Hematocrit 37.6 34.0 - 46.6 %    MCV 91.7 79.0 - 97.0 fL    MCH 30.2 26.6 - 33.0 pg    MCHC 33.0 31.5 - 35.7 g/dL    RDW 12.6 12.3 - 15.4 %    RDW-SD 42.0 37.0 - 54.0 fl    MPV 9.7 6.0 - 12.0 fL    Platelets 254 140 - 450 10*3/mm3    Neutrophil % 48.8 42.7 - 76.0 %    Lymphocyte % 39.5 19.6 - 45.3 %    Monocyte % 10.1 5.0 - 12.0 %    Eosinophil % 0.9 0.3 - 6.2 %    Basophil % 0.5 0.0 - 1.5 %    Immature Grans % 0.2 0.0 - 0.5 %    Neutrophils, Absolute 3.24 1.70 - 7.00 10*3/mm3    Lymphocytes, Absolute 2.62 0.70 - 3.10 10*3/mm3    Monocytes, Absolute 0.67 0.10 - 0.90 10*3/mm3    Eosinophils, Absolute 0.06 0.00 - 0.40 10*3/mm3    Basophils, Absolute 0.03 0.00 - 0.20 10*3/mm3    Immature Grans, Absolute 0.01 0.00 - 0.05 10*3/mm3    nRBC 0.0 0.0 - 0.2 /100 WBC   [facility] Urine Culture Tube - Urine, Clean Catch   Result Value Ref Range    Extra Tube Hold for add-ons.       Comment:      Auto resulted.   Urinalysis, Microscopic Only - Urine, Clean Catch   Result Value Ref Range    RBC, UA 0-2 None Seen, 0-2 /HPF    WBC, UA 0-2 None Seen, 0-2 /HPF    Bacteria, UA None Seen None Seen /HPF    Squamous Epithelial Cells, UA 0-2 None  Seen, 0-2 /HPF    Hyaline Casts, UA None Seen None Seen /LPF    Methodology Automated Microscopy      Your creatinine is elevated and GFR is low, which is consistent with kidney dysfunction. I am unsure of what your baseline is, as I have no labs to compare this to. Recommend following up with your PCP if this is new, so they can recheck labs. Your urine has a trace amount of blood in it, but was otherwise normal. Due to your UTI symptoms, I am going to see if the lab will culture the urine, to ensure there is no bacterial growth. I also recommend following up with your primary care provider, so they can recheck a urinalysis for evidence of blood.   Otherwise, your Celiac panel is negative and your TSH (thyroid function) is normal.   Celiac Ab tTG DGP TIgA; TSH Rfx On Abnormal To Free T4; Comprehensive Metabolic Panel; Urinalysis, Microscopic Only - Urine, Clean Catch; Urinalysis With Culture If Indicated - Urine, Clean Catch (2 more orders)      If you have any questions or concerns, please don't hesitate to call.         Sincerely,        Elvie Barber, APRN

## 2025-07-24 DIAGNOSIS — R35.0 URINARY FREQUENCY: Primary | ICD-10-CM

## 2025-07-24 NOTE — PROGRESS NOTES
Called lab they stated they changed there policys an order for a urine culture will need to be put in.

## 2025-07-25 LAB — BACTERIA SPEC AEROBE CULT: NORMAL

## 2025-08-22 DIAGNOSIS — K59.1 FUNCTIONAL DIARRHEA: ICD-10-CM

## 2025-08-26 RX ORDER — ALOSETRON HYDROCHLORIDE 0.5 MG/1
0.5 TABLET ORAL 2 TIMES DAILY
Qty: 180 TABLET | Refills: 3 | Status: SHIPPED | OUTPATIENT
Start: 2025-08-26